# Patient Record
Sex: FEMALE | Race: WHITE | NOT HISPANIC OR LATINO | Employment: OTHER | URBAN - METROPOLITAN AREA
[De-identification: names, ages, dates, MRNs, and addresses within clinical notes are randomized per-mention and may not be internally consistent; named-entity substitution may affect disease eponyms.]

---

## 2018-05-25 ENCOUNTER — OFFICE VISIT (OUTPATIENT)
Dept: PODIATRY | Facility: CLINIC | Age: 73
End: 2018-05-25
Payer: MEDICARE

## 2018-05-25 ENCOUNTER — HOSPITAL ENCOUNTER (OUTPATIENT)
Dept: RADIOLOGY | Facility: HOSPITAL | Age: 73
Discharge: HOME | End: 2018-05-25
Attending: PODIATRIST
Payer: MEDICARE

## 2018-05-25 VITALS
WEIGHT: 175 LBS | HEART RATE: 78 BPM | TEMPERATURE: 97 F | DIASTOLIC BLOOD PRESSURE: 69 MMHG | RESPIRATION RATE: 18 BRPM | SYSTOLIC BLOOD PRESSURE: 158 MMHG | BODY MASS INDEX: 32.2 KG/M2 | HEIGHT: 62 IN

## 2018-05-25 DIAGNOSIS — M79.672 FOOT PAIN, LEFT: ICD-10-CM

## 2018-05-25 DIAGNOSIS — M72.2 PLANTAR FASCIITIS: ICD-10-CM

## 2018-05-25 PROCEDURE — 73620 X-RAY EXAM OF FOOT: CPT | Mod: 26,50,, | Performed by: RADIOLOGY

## 2018-05-25 PROCEDURE — 99999 PR PBB SHADOW E&M-NEW PATIENT-LVL III: CPT | Mod: 25,PBBFAC,, | Performed by: PODIATRIST

## 2018-05-25 PROCEDURE — 73620 X-RAY EXAM OF FOOT: CPT | Mod: 50,TC,FY

## 2018-05-25 PROCEDURE — 99214 OFFICE O/P EST MOD 30 MIN: CPT | Mod: S$PBB,,, | Performed by: PODIATRIST

## 2018-05-25 PROCEDURE — 99203 OFFICE O/P NEW LOW 30 MIN: CPT | Mod: 25,PBBFAC | Performed by: PODIATRIST

## 2018-05-25 RX ORDER — ALPRAZOLAM 0.5 MG/1
TABLET ORAL
COMMUNITY
End: 2018-11-02

## 2018-05-25 RX ORDER — ATORVASTATIN CALCIUM 10 MG/1
TABLET, FILM COATED ORAL
Refills: 2 | COMMUNITY
Start: 2018-05-10 | End: 2021-09-01

## 2018-05-25 RX ORDER — ATORVASTATIN CALCIUM 10 MG/1
TABLET, FILM COATED ORAL
COMMUNITY
End: 2018-11-02

## 2018-05-25 RX ORDER — ALPRAZOLAM 0.5 MG/1
TABLET ORAL
Refills: 1 | COMMUNITY
Start: 2018-04-03

## 2018-05-25 RX ORDER — PANTOPRAZOLE SODIUM 40 MG/1
TABLET, DELAYED RELEASE ORAL
Refills: 5 | COMMUNITY
Start: 2018-05-06

## 2018-05-25 RX ORDER — LORAZEPAM 0.5 MG/1
TABLET ORAL
Refills: 2 | COMMUNITY
Start: 2018-04-30

## 2018-05-25 RX ORDER — PANTOPRAZOLE SODIUM 40 MG/1
TABLET, DELAYED RELEASE ORAL
COMMUNITY
End: 2018-11-02

## 2018-05-29 PROBLEM — M72.2 PLANTAR FASCIITIS: Status: ACTIVE | Noted: 2018-05-29

## 2018-05-30 NOTE — PROGRESS NOTES
Subjective:      Patient ID: Sita Solis is a 72 y.o. female.    Chief Complaint: Follow-up (left foot pain)  Patient presents for follow-up plantar fasciitis left foot. She relates injection in February worked well, significantly reduced her pain, but did not completely resolved.  States recently pain has been climbing again.  Confirms she is wearing built-up power steps comfortably at all times in tennis shoes. Pain level 6/10.       ROS     Constitutional   Constitutional: Well-developed, well-nourished, no distress, no fever, no night          sweats, no significant weight gain, no significant          weight loss, no exercise intolerance     Eyes         Eyes: no dry eyes, no irritation, no vision change     ENMT   Ears: no difficulty hearing, no ear pain   Nose: no frequent nosebleeds, no nose/sinus problems   Mouth/Throat: no sore throat, no bleeding gums, no snoring, no dry mouth, no            mouth ulcers, no oral abnormalities, no teeth problems     Cardiovascular          Cardiovascular: no chest pain, no arm pain on exertion, no shortness of breath                  when walking, no palpitations    Respiratory           Respiratory: no cough, no wheezing, no congestion    Gastrointestinal   Gastrointestinal: no abdominal pain, no vomiting, normal appetite, no diarrhea,                no vomitting    Genitourinary   Genitourinary: no incontinence, no difficulty urinating, no increased frequency     Musculoskeletal        Musculoskeletal: no muscle aches, no muscle weakness, no arthralgias/joint pain,                   no back pain, no swelling in the extremities    Integumentary   Skin: no abnormal mole, no jaundice, no rashes     Neurologic          Neurologic: no loss of consciousness, no weakness, no numbness, no seizures,                   no dizziness, no headaches     Psychiatric   Psych: no depression, no sleep disturbances    Endocrine   Endocrine: no fatigue     Hematologic/Lymphatic          No bruising     Allergic/Immunologic    Allergy/Immunologic: no runny nose, no sinus pressure, no itching, no hives,              no  frequent sneezing               Objective:      Physical Exam  Vascular   Arterial Pulses Left: posterior tibialis 2/4, dorsalis pedis 2/4    Varicosities Left: capillary refill test normal (pedal skin temperature and color are normal)     Integumentary   Unremarkable left, mild edema heel     Neurological   Neurological Right: gross sensation intact   Neurological Left: gross sensation intact, no castanon's neuritis  Manual Muscle Test Left: plantarflexors 5/5 (movement against resistance), dorsiflexors 5/5 (movement against resistance), invertors 5/5 (movement against resistance), evertors 5/5 (movement against resistance)     Musculoskeletal   Muscle Strength and Tone Right: normal, normal tone   Muscle Strength and Tone Left: normal, normal tone   Joints, Bones, and Muscles Right: pes cavus   Joints, Bones, and Muscles Left: pes cavus, pain and medial tubercle of the calcaneus at the plantar fascia          Assessment:       Encounter Diagnoses   Name Primary?    Plantar fasciitis     Foot pain, left          Plan:       Sita was seen today for follow-up.    Diagnoses and all orders for this visit:    Plantar fasciitis  -     Ambulatory consult to Physical Therapy    Foot pain, left  -     X-Ray Foot 2 View Bilateral; Future      Reviewed x-rays with patient reassured her nose for or other bony problem on the bottom of the right heel contributing to her plantar fasciitis.  Recommended cortisone injection since this was effective ainFebruary, however explained the patient she needs to continue all conservative treatments, stretching, arch supports, tennis shoes and follow-up is an absolute must if pain has not resolved in a few weeks.  Explained the patient's treatment needs to be followed through until completely resolved, if not remaining inflammation will definitely continue to  flare up again.  Patient related she was in understanding, however declined injection.  We discussed continuing conservative treatments and physical therapy.  Patient was in agreement with this treatment plan.  Advised patient to monitor condition closely, if pain level increases above 6/10 would recommend cortisone injection before pain climbs any further.   I counseled the patient on her conditions, their implications and medical management.  Instructed patient to contact the office with any changes, questions, concerns, worsening of symptoms. Patient/family verbalized understanding.   Total face to face time, exam, assessment, treatment, discussion, 25 minutes, more than half this time spent on consultation and coordination of care.   Follow up as needed.

## 2018-11-02 ENCOUNTER — OFFICE VISIT (OUTPATIENT)
Dept: PODIATRY | Facility: CLINIC | Age: 73
End: 2018-11-02
Payer: MEDICARE

## 2018-11-02 VITALS
HEIGHT: 62 IN | TEMPERATURE: 98 F | BODY MASS INDEX: 33.13 KG/M2 | SYSTOLIC BLOOD PRESSURE: 158 MMHG | HEART RATE: 80 BPM | DIASTOLIC BLOOD PRESSURE: 76 MMHG | WEIGHT: 180 LBS

## 2018-11-02 DIAGNOSIS — M72.2 PLANTAR FASCIITIS: ICD-10-CM

## 2018-11-02 DIAGNOSIS — D36.10 NEUROMA: Primary | ICD-10-CM

## 2018-11-02 PROCEDURE — 99214 OFFICE O/P EST MOD 30 MIN: CPT | Mod: S$PBB,,, | Performed by: PODIATRIST

## 2018-11-02 PROCEDURE — 99999 PR PBB SHADOW E&M-EST. PATIENT-LVL III: CPT | Mod: PBBFAC,,, | Performed by: PODIATRIST

## 2018-11-02 PROCEDURE — 99213 OFFICE O/P EST LOW 20 MIN: CPT | Mod: PBBFAC | Performed by: PODIATRIST

## 2018-11-10 NOTE — PROGRESS NOTES
"Subjective:      Patient ID: Sita Solis is a 72 y.o. female.    Chief Complaint: Follow-up; Foot Problem; and Foot Pain  Patient presents with complaint of pain between 1st and 2nd digits right foot.  This has been present   for about 1 month, area feels "numbish"  with pain which escalates with activity.  Highest pain level   6/10 at times.  She also presents for follow-up plantar fasciitis left foot which she states has resolved.    She is careful regarding appropriate shoes and arch support, is very active      ROS     Constitutional    Pleasant, well-nourished, no distress, well oriented    Eyes         GLASSES    Cardiovascular          No chest pain, no shortness of breath    Respiratory         No cough, no congestion     Musculoskeletal        No muscle aches, no arthralgias/joint pain, no back pain, no swelling in the extremities    Neurologic         No weakness, no numbness    Psychiatric         YES anxiety          Objective:      Physical Exam  Vascular         Arterial Pulses Right: posterior tibialis 2/4, dorsalis pedis 2/4, normal CFT   Arterial Pulses Left: posterior tibialis 2/4, dorsalis pedis 2/4, normal CFT   No lower extremity edema bilateral   Pedal skin temperature and color are normal bilateral     Integumentary   Bony foot type and structure with lack of fat pad  No edema dorsal right foot, no calor or erythema         Neurological   Gross sensation intact, pain/ paresthesias consistent with neuroma the deep peroneal nerve right         foot    Musculoskeletal   Muscle Strength/Testing and Tone:  Intact, normal tone bilateral   Joints, Bones, and Muscles: Normal with normal ROM for age bilateral. Cavus foot type bilateral.       No plantar fascial pain left foot          Assessment:       Encounter Diagnoses   Name Primary?    Neuroma - Right Foot Yes    Plantar fasciitis - Left Foot          Plan:       Sita was seen today for follow-up, foot problem and foot pain.    Diagnoses " and all orders for this visit:    Neuroma - Right Foot    Plantar fasciitis - Left Foot      Discussed  Neuroma 1st webspace with patient.  Reviewed appropriate shoes which must be wide, light, can this material with plenty of room for the toes,   thick sole for shock absorption.  Advised patient make sure arch supports fit appropriately to accommodate   previous plantar fascial pain without making the front of the shoe too tight.  We reviewed ice/ cool therapy   frequency this should be performed in this region.  Instructed patient to apply Voltaren gel 3 times daily to the area.  She confirms she still has this medication.  Reviewed appropriate shoes for around the house, no flat shoes or walking barefoot.  Advised patient she   can take over-the-counter anti-inflammatory as directed for 3-5 days, discontinue of stomach upset.    Explained the patient these are conservative treatments to support the foot and reduce inflammation, if not   successful may consider cortisone injection.  Patient was in understanding and agreement with treatment plan.  Again reviewed appropriate shoes for plantar fasciitis.  Reassured patient no evidence of this condition remains,   she is at risk for flare up, needs to monitor closely.  Counseled the patient on her conditions, their implications and medical management.  Instructed patient to contact the office with any changes, questions, concerns, worsening of symptoms.   Patient verbalized understanding.   Total face to face time, exam, assessment, treatment, discussion, documentation 25 minutes, more than   half this time spent on consultation and coordination of care.   Follow up 2 weeks.      This note was created using [employer] voice recognition software that occasionally misinterpreted phrases   or words.

## 2021-09-01 ENCOUNTER — OFFICE VISIT (OUTPATIENT)
Dept: PODIATRY | Facility: CLINIC | Age: 76
End: 2021-09-01
Payer: MEDICARE

## 2021-09-01 VITALS
WEIGHT: 181.56 LBS | BODY MASS INDEX: 33.41 KG/M2 | SYSTOLIC BLOOD PRESSURE: 142 MMHG | DIASTOLIC BLOOD PRESSURE: 65 MMHG | RESPIRATION RATE: 15 BRPM | HEART RATE: 88 BPM | OXYGEN SATURATION: 95 % | HEIGHT: 62 IN

## 2021-09-01 DIAGNOSIS — L60.0 INGROWN NAIL OF GREAT TOE OF LEFT FOOT: Primary | ICD-10-CM

## 2021-09-01 DIAGNOSIS — M19.072 ARTHRITIS OF FIRST METATARSOPHALANGEAL (MTP) JOINT OF LEFT FOOT: ICD-10-CM

## 2021-09-01 DIAGNOSIS — L84 FOOT CALLUS: ICD-10-CM

## 2021-09-01 DIAGNOSIS — M19.071 ARTHRITIS OF FIRST METATARSOPHALANGEAL (MTP) JOINT OF RIGHT FOOT: ICD-10-CM

## 2021-09-01 PROCEDURE — 99213 PR OFFICE/OUTPT VISIT, EST, LEVL III, 20-29 MIN: ICD-10-PCS | Mod: S$PBB,,, | Performed by: PODIATRIST

## 2021-09-01 PROCEDURE — 99213 OFFICE O/P EST LOW 20 MIN: CPT | Mod: PBBFAC,PN | Performed by: PODIATRIST

## 2021-09-01 PROCEDURE — 99999 PR PBB SHADOW E&M-EST. PATIENT-LVL III: CPT | Mod: PBBFAC,,, | Performed by: PODIATRIST

## 2021-09-01 PROCEDURE — 99999 PR PBB SHADOW E&M-EST. PATIENT-LVL III: ICD-10-PCS | Mod: PBBFAC,,, | Performed by: PODIATRIST

## 2021-09-01 PROCEDURE — 99213 OFFICE O/P EST LOW 20 MIN: CPT | Mod: S$PBB,,, | Performed by: PODIATRIST

## 2021-09-01 RX ORDER — METFORMIN HYDROCHLORIDE 500 MG/1
500 TABLET ORAL 2 TIMES DAILY
COMMUNITY
Start: 2021-08-05

## 2021-09-01 RX ORDER — FLUTICASONE PROPIONATE 50 MCG
1 SPRAY, SUSPENSION (ML) NASAL
COMMUNITY
Start: 2019-06-24

## 2021-09-01 RX ORDER — ATORVASTATIN CALCIUM 20 MG/1
20 TABLET, FILM COATED ORAL DAILY
COMMUNITY
Start: 2021-08-19

## 2021-09-01 RX ORDER — OLMESARTAN MEDOXOMIL 20 MG/1
20 TABLET ORAL DAILY
COMMUNITY
Start: 2021-08-31

## 2021-11-11 ENCOUNTER — HOSPITAL ENCOUNTER (OUTPATIENT)
Dept: RADIOLOGY | Facility: CLINIC | Age: 76
Discharge: HOME | End: 2021-11-11
Payer: MEDICARE

## 2021-11-11 DIAGNOSIS — Z12.31 ENCOUNTER FOR SCREENING MAMMOGRAM FOR MALIGNANT NEOPLASM OF BREAST: Primary | ICD-10-CM

## 2021-11-11 DIAGNOSIS — Z12.31 ENCOUNTER FOR SCREENING MAMMOGRAM FOR MALIGNANT NEOPLASM OF BREAST: ICD-10-CM

## 2021-11-11 PROCEDURE — 77063 MAMMO DIGITAL SCREENING BILAT WITH TOMO: ICD-10-PCS | Mod: S$GLB,,, | Performed by: RADIOLOGY

## 2021-11-11 PROCEDURE — 77067 SCR MAMMO BI INCL CAD: CPT | Mod: S$GLB,,, | Performed by: RADIOLOGY

## 2021-11-11 PROCEDURE — 77067 MAMMO DIGITAL SCREENING BILAT WITH TOMO: ICD-10-PCS | Mod: S$GLB,,, | Performed by: RADIOLOGY

## 2021-11-11 PROCEDURE — 77063 BREAST TOMOSYNTHESIS BI: CPT | Mod: S$GLB,,, | Performed by: RADIOLOGY

## 2022-10-03 ENCOUNTER — TELEPHONE (OUTPATIENT)
Dept: OBSTETRICS AND GYNECOLOGY | Facility: CLINIC | Age: 77
End: 2022-10-03
Payer: COMMERCIAL

## 2022-10-03 NOTE — TELEPHONE ENCOUNTER
----- Message from Ej Cote LPN sent at 10/3/2022  2:30 PM CDT -----  Please order mammogram and I can attach. Thanks  ----- Message -----  From: Emeli Landeros  Sent: 10/3/2022   2:26 PM CDT  To: [facility] Staff    Type: Needs Medical Advice         Who Called: pt  Best Call Back Number:[phone] p  Additional Information: Requesting a call back regarding mammo appt please attach orders   Please Advise- Thank you

## 2022-10-03 NOTE — TELEPHONE ENCOUNTER
Attempted to reach pt to notify of RAJINDER Bell' instruction and schedule annual visit with provider alongside her mammogram appointment for 11/14. No answer, LMTRC.

## 2025-06-23 ENCOUNTER — OFFICE VISIT (OUTPATIENT)
Dept: PODIATRY | Facility: CLINIC | Age: 80
End: 2025-06-23
Payer: MEDICARE

## 2025-06-23 VITALS
BODY MASS INDEX: 29.63 KG/M2 | HEART RATE: 87 BPM | RESPIRATION RATE: 18 BRPM | DIASTOLIC BLOOD PRESSURE: 64 MMHG | WEIGHT: 162 LBS | SYSTOLIC BLOOD PRESSURE: 139 MMHG

## 2025-06-23 DIAGNOSIS — M21.962 ACQUIRED DEFORMITY OF JOINT OF LEFT FOOT: ICD-10-CM

## 2025-06-23 DIAGNOSIS — B35.1 SUPERFICIAL WHITE ONYCHOMYCOSIS: ICD-10-CM

## 2025-06-23 DIAGNOSIS — M77.42 METATARSALGIA, LEFT FOOT: ICD-10-CM

## 2025-06-23 DIAGNOSIS — E11.9 CONTROLLED TYPE 2 DIABETES MELLITUS WITHOUT COMPLICATION, UNSPECIFIED WHETHER LONG TERM INSULIN USE: ICD-10-CM

## 2025-06-23 DIAGNOSIS — L85.1 ACQUIRED KERATOSIS OF PLANTAR ASPECT OF FOOT: ICD-10-CM

## 2025-06-23 DIAGNOSIS — L84 FOOT CALLUS: ICD-10-CM

## 2025-06-23 DIAGNOSIS — G57.82 INTERDIGITAL NEUROMA OF LEFT FOOT: Primary | ICD-10-CM

## 2025-06-23 PROCEDURE — 99999 PR PBB SHADOW E&M-EST. PATIENT-LVL III: CPT | Mod: PBBFAC,,, | Performed by: PODIATRIST

## 2025-06-23 PROCEDURE — 99213 OFFICE O/P EST LOW 20 MIN: CPT | Mod: PBBFAC | Performed by: PODIATRIST

## 2025-06-23 PROCEDURE — 99203 OFFICE O/P NEW LOW 30 MIN: CPT | Mod: S$PBB,,, | Performed by: PODIATRIST

## 2025-06-23 RX ORDER — VIT C/E/ZN/COPPR/LUTEIN/ZEAXAN 250MG-90MG
125 CAPSULE ORAL
COMMUNITY
Start: 2025-03-31 | End: 2025-09-27

## 2025-06-23 RX ORDER — BLOOD SUGAR DIAGNOSTIC
STRIP MISCELLANEOUS
COMMUNITY
Start: 2025-06-12

## 2025-06-23 RX ORDER — NYSTATIN 100000 U/G
CREAM TOPICAL 4 TIMES DAILY
COMMUNITY
Start: 2025-03-31

## 2025-06-23 RX ORDER — OLMESARTAN MEDOXOMIL 5 MG/1
5 TABLET, FILM COATED ORAL
COMMUNITY
Start: 2025-02-06

## 2025-06-23 RX ORDER — MAGNESIUM 200 MG
1000 TABLET ORAL
COMMUNITY
Start: 2025-03-31 | End: 2025-06-29

## 2025-06-23 RX ORDER — EMPAGLIFLOZIN 25 MG/1
25 TABLET, FILM COATED ORAL
COMMUNITY

## 2025-06-23 NOTE — PROGRESS NOTES
Subjective:       Patient ID: Sita Solis is a 79 y.o. female.    Chief Complaint: Toe Pain (Top Left Great Toe), Callouses (Under Right Great Toe), and Diabetes Mellitus  Presents with complaint of pain across the front of the left foot, patient relates this is especially painful when she walks and bends the front of her foot.  Patient also relates a lot of problems with the left great toe, feels ingrown most of the time yet there is no redness or swelling.  She injured the left great toenail 50 years ago and over the last few years she has noticed the nail becoming more painful.  Does wear tennis shoes lot and patient relates awful in the fabric always still develops over the top of the left great toe.  She has avoided pedicures or nail polish.  She relates problems with calluses under the big toe joint left greater than right foot, tries to work on him on a fairly regular basis  She has recently developed a harder callus under the bottom of the right great toe but it is not painful  Pain level left foot 4/10  History of well-controlled type 2 diabetes on Jardiance      Past Medical History:   Diagnosis Date    Anxiety     Diabetes mellitus, type 2     Diverticulitis     Hyperlipidemia     Hypertension      Past Surgical History:   Procedure Laterality Date    APPENDECTOMY      BREAST BIOPSY      CHOLECYSTECTOMY       No family history on file.  Social History     Socioeconomic History    Marital status:    Tobacco Use    Smoking status: Former    Smokeless tobacco: Never   Substance and Sexual Activity    Alcohol use: Yes     Comment: occasional    Drug use: No       Current Medications[1]  Review of patient's allergies indicates:   Allergen Reactions    Metformin Other (See Comments)       Review of Systems   Musculoskeletal:  Positive for arthralgias and gait problem.   All other systems reviewed and are negative.      Objective:      Vitals:    06/23/25 1125   BP: 139/64   Pulse: 87   Resp: 18    Weight: 73.5 kg (162 lb)     Physical Exam  Vitals and nursing note reviewed.   Constitutional:       General: She is not in acute distress.     Appearance: Normal appearance.   Cardiovascular:      Pulses:           Dorsalis pedis pulses are 2+ on the right side and 2+ on the left side.        Posterior tibial pulses are 1+ on the right side and 1+ on the left side.   Musculoskeletal:         General: Tenderness present.      Right foot: Prominent metatarsal heads present.      Left foot: Deformity (Dorsiflexed left hallux very prominent 1st met head left greater than right.  Pes cavus foot type bilateral) and prominent metatarsal heads present.      Comments: Pain upon compression 2nd 3rd and most painful 1st common plantar digital nerve left foot consistent with interdigital neuroma/forefoot metatarsalgia   Feet:      Right foot:      Skin integrity: Callus (Callus with IPK plantar right hallux, mild callus sub 1st met head right) present.      Left foot:      Skin integrity: Callus (Moderate callus sub 1st met head left.  Superficial white onychomycosis several nails) present.      Toenail Condition: Left toenails are normal.   Skin:     Capillary Refill: Capillary refill takes 2 to 3 seconds.      Comments: Varicose veins   Neurological:      General: No focal deficit present.      Mental Status: She is alert.   Psychiatric:         Thought Content: Thought content normal.                        Assessment:       1. Interdigital neuroma of left foot    2. Metatarsalgia, left foot    3. 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 Foot callus    5. Acquired keratosis of plantar aspect of foot - Right Foot    6. Superficial white onychomycosis    7. Controlled type 2 diabetes mellitus without complication, unspecified whether long term insulin use        Plan:           Reviewed pain throughout the front of the foot as it is painful upon compression of the 2nd, 3rd and mostly the 1st webspace of the left  foot  Discussed with patient and natural position of the left great toe is slightly elevated and patient understands toe/more specifically the nail hits on the top of her tennis shoe when she walks  Advised patient due to the elevated position of the big toe this puts additional pressure on the joint underneath, we discussed callus sub 1st met head left foot is chronic, there is a permanent skin crease along the medial aspect.  It is present on the right foot but not as severe.  Advised patient it is well-maintained in both areas and we discussed what to monitor for regarding any potential complications especially if there is any increased pain or more specifically discoloration within the callus  We discussed small IPK/seed corn which is developed on the bottom of the right great toe  These 3 areas of callus were debrided and we discussed multiple topical treatments to try to prevent recurrence and keep the skin well hydrated  Instructed patient on use of over-the-counter Voltaren gel for pain across the forefoot left, 3 times a day for 1 week then twice daily until resolved, typically takes about 2 weeks, occasionally 3 weeks  During this period of time advised patient tennis shoes with a thick sole for shock absorption is highly recommended.  This takes a lot of pressure off the joint with a thicker sole of the shoe absorbing majority of the pressure  We discussed arch supports since she has a fairly high arch  Reviewed appropriate shoes indoors and patient understands she should not be barefoot or in flat shoes at all  Advised patient she does have a tendency for an ingrown nail in the medial aspect of the left great toe but the nail is not ingrown at this time  It does have some superficial white fungus and this was filed off the surface of this nail as well as a few other nails  Advised patient this early fungus should be treated topically and we discussed using Vicks vapor rub both for fungus and for toenail  pain of the left great toe.  Apply twice daily until pain-free then once daily as preventative treatment of the left great toenail which most likely will continue to need long term topical treatment due to position of the toe  Reviewed diabetic foot care, what to monitor for regarding potential complications, daily foot checks and instructed patient to contact office with any area of concern which has not improved in a few days  Patient was in understanding and agreement with treatment plan.  I counseled the patient on their conditions, implications and medical management.  Instructed patient to contact the office with any changes, questions, concerns, worsening of symptoms.   Total face to face time 30 minutes, exam, assessment, treatment, discussion, additional time for review of chart prior to and following appointment and visit documentation, consultation and coordination of care.    Follow up if not pain-free in 3 weeks    This note was created using [employer] voice recognition software that occasionally misinterpreted phrases or words.         [1]   Current Outpatient Medications   Medication Sig Dispense Refill    atorvastatin (LIPITOR) 20 MG tablet Take 20 mg by mouth once daily.      cholecalciferol, vitamin D3, 125 mcg (5,000 unit) capsule 125 mcg.      cyanocobalamin, vitamin B-12, 1,000 mcg Subl 1,000 mcg.      fluticasone propionate (FLONASE ALLERGY RELIEF) 50 mcg/actuation nasal spray 1 spray.      JARDIANCE 25 mg tablet Take 25 mg by mouth.      nystatin (MYCOSTATIN) cream Apply topically 4 (four) times daily.      olmesartan (BENICAR) 5 MG Tab 5 mg.      ONETOUCH ULTRA TEST Strp SMARTSI Strip(s) Daily PRN      pantoprazole (PROTONIX) 40 MG tablet TK 1 T PO D  5     No current facility-administered medications for this visit.